# Patient Record
Sex: FEMALE | Race: WHITE | NOT HISPANIC OR LATINO | URBAN - METROPOLITAN AREA
[De-identification: names, ages, dates, MRNs, and addresses within clinical notes are randomized per-mention and may not be internally consistent; named-entity substitution may affect disease eponyms.]

---

## 2019-01-22 ENCOUNTER — HOSPITAL ENCOUNTER (EMERGENCY)
Facility: HOSPITAL | Age: 54
Discharge: HOME | End: 2019-01-22
Attending: EMERGENCY MEDICINE
Payer: COMMERCIAL

## 2019-01-22 ENCOUNTER — APPOINTMENT (EMERGENCY)
Dept: RADIOLOGY | Facility: HOSPITAL | Age: 54
End: 2019-01-22
Payer: COMMERCIAL

## 2019-01-22 VITALS
DIASTOLIC BLOOD PRESSURE: 57 MMHG | RESPIRATION RATE: 20 BRPM | OXYGEN SATURATION: 97 % | HEIGHT: 65 IN | SYSTOLIC BLOOD PRESSURE: 134 MMHG | TEMPERATURE: 98.8 F | HEART RATE: 106 BPM | WEIGHT: 195 LBS | BODY MASS INDEX: 32.49 KG/M2

## 2019-01-22 DIAGNOSIS — T14.8XXA MUSCLE STRAIN: Primary | ICD-10-CM
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
## 2019-01-22 PROCEDURE — 72170 X-RAY EXAM OF PELVIS: CPT

## 2019-01-22 PROCEDURE — 73030 X-RAY EXAM OF SHOULDER: CPT | Mod: 50

## 2019-01-22 PROCEDURE — 72100 X-RAY EXAM L-S SPINE 2/3 VWS: CPT

## 2019-01-22 PROCEDURE — 71045 X-RAY EXAM CHEST 1 VIEW: CPT

## 2019-01-22 PROCEDURE — 63700000 HC SELF-ADMINISTRABLE DRUG: Performed by: PHYSICIAN ASSISTANT

## 2019-01-22 PROCEDURE — 99283 EMERGENCY DEPT VISIT LOW MDM: CPT | Mod: 25

## 2019-01-22 RX ORDER — MOMETASONE FUROATE 100 UG/1
AEROSOL RESPIRATORY (INHALATION)
COMMUNITY
Start: 2018-12-19

## 2019-01-22 RX ORDER — TRAMADOL HYDROCHLORIDE 50 MG/1
50 TABLET ORAL
COMMUNITY
Start: 2018-02-26

## 2019-01-22 RX ORDER — ZOLPIDEM TARTRATE 6.25 MG/1
6.25 TABLET, FILM COATED, EXTENDED RELEASE ORAL DAILY PRN
COMMUNITY
Start: 2018-07-11

## 2019-01-22 RX ORDER — ZOLPIDEM TARTRATE 5 MG/1
5 TABLET ORAL
COMMUNITY
Start: 2018-01-22

## 2019-01-22 RX ORDER — IBUPROFEN 600 MG/1
600 TABLET ORAL ONCE
Status: COMPLETED | OUTPATIENT
Start: 2019-01-22 | End: 2019-01-22

## 2019-01-22 RX ORDER — CYCLOBENZAPRINE HCL 10 MG
10 TABLET ORAL 2 TIMES DAILY PRN
Qty: 10 TABLET | Refills: 0 | Status: SHIPPED | OUTPATIENT
Start: 2019-01-22

## 2019-01-22 RX ORDER — CLOTRIMAZOLE AND BETAMETHASONE DIPROPIONATE 10; .64 MG/G; MG/G
CREAM TOPICAL
COMMUNITY
Start: 2018-11-07

## 2019-01-22 RX ORDER — OMEPRAZOLE 20 MG/1
20 CAPSULE, DELAYED RELEASE ORAL DAILY
COMMUNITY
Start: 2018-08-09

## 2019-01-22 RX ORDER — IBUPROFEN 600 MG/1
600 TABLET ORAL
COMMUNITY
Start: 2017-07-10

## 2019-01-22 RX ORDER — FLUCONAZOLE 150 MG/1
TABLET ORAL
COMMUNITY
Start: 2018-11-07

## 2019-01-22 RX ORDER — ALBUTEROL SULFATE 90 UG/1
1 INHALANT RESPIRATORY (INHALATION)
COMMUNITY
Start: 2017-07-10

## 2019-01-22 RX ADMIN — IBUPROFEN 600 MG: 600 TABLET ORAL at 16:44

## 2019-01-22 ASSESSMENT — ENCOUNTER SYMPTOMS
PALPITATIONS: 0
EYE PAIN: 0
HEADACHES: 0
ABDOMINAL PAIN: 0
HEMATURIA: 0
DYSURIA: 0
SEIZURES: 0
BACK PAIN: 1
LOSS OF CONSCIOUSNESS: 0
NUMBNESS: 0
COLOR CHANGE: 0
ARTHRALGIAS: 0
FEVER: 0
NECK PAIN: 1
SHORTNESS OF BREATH: 0
COUGH: 0
NAUSEA: 0
VOMITING: 0
CHILLS: 0
SORE THROAT: 0
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
## 2019-01-22 NOTE — DISCHARGE INSTRUCTIONS
Rest, avoid exertion  Followup with your primary doctor within 2-3 days. Bring paperwork from today's visit and discuss all labs or imaging results   Return to the ER immediately for any worsening symptoms or other concerns

## 2019-01-23 NOTE — ED ATTESTATION NOTE
The patient was evaluated and managed by the physician assistant.  I agree with the PA/NP’s history, physical, assessment, and plan of care, with the following exceptions: None       Fly Aguirre MD  01/22/19 2015

## 2022-08-05 ENCOUNTER — RX ONLY (OUTPATIENT)
Age: 57
Setting detail: RX ONLY
End: 2022-08-05

## 2022-08-05 ENCOUNTER — APPOINTMENT (RX ONLY)
Dept: URBAN - METROPOLITAN AREA CLINIC 28 | Facility: CLINIC | Age: 57
Setting detail: DERMATOLOGY
End: 2022-08-05

## 2022-08-05 RX ORDER — TRIAMCINOLONE ACETONIDE 1 MG/G
1 APPLICATION CREAM TOPICAL BID PRN
Qty: 80 | Refills: 2 | Status: ERX | COMMUNITY
Start: 2022-08-05

## 2022-08-05 RX ORDER — HYDROCORTISONE 25 MG/G
1 APPLICATION CREAM TOPICAL BID PRN
Qty: 30 | Refills: 2 | Status: ERX | COMMUNITY
Start: 2022-08-05

## 2022-08-23 ENCOUNTER — APPOINTMENT (OUTPATIENT)
Dept: LAB | Facility: HOSPITAL | Age: 57
End: 2022-08-23
Attending: INTERNAL MEDICINE
Payer: COMMERCIAL

## 2022-08-23 ENCOUNTER — TRANSCRIBE ORDERS (OUTPATIENT)
Dept: LAB | Facility: HOSPITAL | Age: 57
End: 2022-08-23

## 2022-08-23 DIAGNOSIS — R79.89 OTHER SPECIFIED ABNORMAL FINDINGS OF BLOOD CHEMISTRY: ICD-10-CM

## 2022-08-23 DIAGNOSIS — C50.919 MALIGNANT NEOPLASM OF UNSPECIFIED SITE OF UNSPECIFIED FEMALE BREAST (CMS/HCC): ICD-10-CM

## 2022-08-23 DIAGNOSIS — E78.2 MIXED HYPERLIPIDEMIA: Primary | ICD-10-CM

## 2022-08-23 DIAGNOSIS — E78.2 MIXED HYPERLIPIDEMIA: ICD-10-CM

## 2022-08-23 LAB
25(OH)D3 SERPL-MCNC: 47 NG/ML (ref 30–100)
ALBUMIN SERPL-MCNC: 4.5 G/DL (ref 3.4–5)
ALP SERPL-CCNC: 68 IU/L (ref 35–126)
ALT SERPL-CCNC: 22 IU/L (ref 11–54)
ANION GAP SERPL CALC-SCNC: 13 MEQ/L (ref 3–15)
AST SERPL-CCNC: 18 IU/L (ref 15–41)
BASOPHILS # BLD: 0.08 K/UL (ref 0.01–0.1)
BASOPHILS NFR BLD: 0.7 %
BILIRUB SERPL-MCNC: 0.6 MG/DL (ref 0.3–1.2)
BUN SERPL-MCNC: 7 MG/DL (ref 8–20)
CALCIUM SERPL-MCNC: 10.1 MG/DL (ref 8.9–10.3)
CANCER AG15-3 SERPL-ACNC: 13.1 U/ML (ref 0–31.3)
CHLORIDE SERPL-SCNC: 101 MEQ/L (ref 98–109)
CHOLEST SERPL-MCNC: 182 MG/DL
CO2 SERPL-SCNC: 25 MEQ/L (ref 22–32)
CREAT SERPL-MCNC: 0.6 MG/DL (ref 0.6–1.1)
DIFFERENTIAL METHOD BLD: ABNORMAL
EOSINOPHIL # BLD: 1.65 K/UL (ref 0.04–0.36)
EOSINOPHIL NFR BLD: 15.1 %
ERYTHROCYTE [DISTWIDTH] IN BLOOD BY AUTOMATED COUNT: 13.5 % (ref 11.7–14.4)
EST. AVERAGE GLUCOSE BLD GHB EST-MCNC: 128 MG/DL
GFR SERPL CREATININE-BSD FRML MDRD: >60 ML/MIN/1.73M*2
GLUCOSE SERPL-MCNC: 93 MG/DL (ref 70–99)
HBA1C MFR BLD HPLC: 6.1 %
HCT VFR BLDCO AUTO: 39.5 % (ref 35–45)
HDLC SERPL-MCNC: 66 MG/DL
HDLC SERPL: 2.8 {RATIO}
HGB BLD-MCNC: 13 G/DL (ref 11.8–15.7)
IMM GRANULOCYTES # BLD AUTO: 0.05 K/UL (ref 0–0.08)
IMM GRANULOCYTES NFR BLD AUTO: 0.5 %
LDLC SERPL CALC-MCNC: 95 MG/DL
LYMPHOCYTES # BLD: 2.66 K/UL (ref 1.2–3.5)
LYMPHOCYTES NFR BLD: 24.3 %
MCH RBC QN AUTO: 29.5 PG (ref 28–33.2)
MCHC RBC AUTO-ENTMCNC: 32.9 G/DL (ref 32.2–35.5)
MCV RBC AUTO: 89.8 FL (ref 83–98)
MONOCYTES # BLD: 0.67 K/UL (ref 0.28–0.8)
MONOCYTES NFR BLD: 6.1 %
NEUTROPHILS # BLD: 5.85 K/UL (ref 1.7–7)
NEUTS SEG NFR BLD: 53.3 %
NONHDLC SERPL-MCNC: 116 MG/DL
NRBC BLD-RTO: 0 %
PDW BLD AUTO: 9.4 FL (ref 9.4–12.3)
PLATELET # BLD AUTO: 310 K/UL (ref 150–369)
POTASSIUM SERPL-SCNC: 4.1 MEQ/L (ref 3.6–5.1)
PROT SERPL-MCNC: 7.2 G/DL (ref 6–8.2)
RBC # BLD AUTO: 4.4 M/UL (ref 3.93–5.22)
SODIUM SERPL-SCNC: 139 MEQ/L (ref 136–144)
TRIGL SERPL-MCNC: 107 MG/DL (ref 30–149)
TSH SERPL DL<=0.05 MIU/L-ACNC: 2.04 MIU/L (ref 0.34–5.6)
WBC # BLD AUTO: 10.96 K/UL (ref 3.8–10.5)

## 2022-08-23 PROCEDURE — 80053 COMPREHEN METABOLIC PANEL: CPT

## 2022-08-23 PROCEDURE — 86300 IMMUNOASSAY TUMOR CA 15-3: CPT

## 2022-08-23 PROCEDURE — 36415 COLL VENOUS BLD VENIPUNCTURE: CPT

## 2022-08-23 PROCEDURE — 82306 VITAMIN D 25 HYDROXY: CPT

## 2022-08-23 PROCEDURE — 85025 COMPLETE CBC W/AUTO DIFF WBC: CPT

## 2022-08-23 PROCEDURE — 83036 HEMOGLOBIN GLYCOSYLATED A1C: CPT

## 2022-08-23 PROCEDURE — 80061 LIPID PANEL: CPT

## 2022-08-23 PROCEDURE — 84443 ASSAY THYROID STIM HORMONE: CPT

## 2022-08-30 ENCOUNTER — APPOINTMENT (OUTPATIENT)
Dept: LAB | Facility: HOSPITAL | Age: 57
End: 2022-08-30
Attending: INTERNAL MEDICINE
Payer: COMMERCIAL

## 2022-08-30 ENCOUNTER — TRANSCRIBE ORDERS (OUTPATIENT)
Dept: LAB | Facility: HOSPITAL | Age: 57
End: 2022-08-30

## 2022-08-30 DIAGNOSIS — R73.9 HYPERGLYCEMIA, UNSPECIFIED: ICD-10-CM

## 2022-08-30 DIAGNOSIS — R73.9 HYPERGLYCEMIA, UNSPECIFIED: Primary | ICD-10-CM

## 2022-08-30 LAB
BACTERIA URNS QL MICRO: ABNORMAL /HPF
BILIRUB UR QL STRIP.AUTO: NEGATIVE MG/DL
CLARITY UR REFRACT.AUTO: CLEAR
COLOR UR AUTO: YELLOW
GLUCOSE UR STRIP.AUTO-MCNC: NEGATIVE MG/DL
HGB UR QL STRIP.AUTO: ABNORMAL
HYALINE CASTS #/AREA URNS LPF: ABNORMAL /LPF
KETONES UR STRIP.AUTO-MCNC: NEGATIVE MG/DL
LEUKOCYTE ESTERASE UR QL STRIP.AUTO: 1
MUCOUS THREADS URNS QL MICRO: 2 /LPF
NITRITE UR QL STRIP.AUTO: NEGATIVE
PH UR STRIP.AUTO: 6 [PH]
PROT UR QL STRIP.AUTO: ABNORMAL
RBC #/AREA URNS HPF: ABNORMAL /HPF
SP GR UR REFRACT.AUTO: 1.03
SQUAMOUS URNS QL MICRO: ABNORMAL /HPF
TRANS CELLS URNS QL MICRO: ABNORMAL /HPF
UROBILINOGEN UR STRIP-ACNC: 0.2 EU/DL
WBC #/AREA URNS HPF: ABNORMAL /HPF

## 2022-08-30 PROCEDURE — 81001 URINALYSIS AUTO W/SCOPE: CPT

## 2023-06-01 ENCOUNTER — RX ONLY (OUTPATIENT)
Age: 58
Setting detail: RX ONLY
End: 2023-06-01

## 2023-06-01 ENCOUNTER — APPOINTMENT (RX ONLY)
Dept: URBAN - METROPOLITAN AREA CLINIC 28 | Facility: CLINIC | Age: 58
Setting detail: DERMATOLOGY
End: 2023-06-01

## 2023-06-01 DIAGNOSIS — L20.89 OTHER ATOPIC DERMATITIS: ICD-10-CM

## 2023-06-01 DIAGNOSIS — L81.4 OTHER MELANIN HYPERPIGMENTATION: ICD-10-CM

## 2023-06-01 DIAGNOSIS — D22 MELANOCYTIC NEVI: ICD-10-CM

## 2023-06-01 DIAGNOSIS — L82.1 OTHER SEBORRHEIC KERATOSIS: ICD-10-CM

## 2023-06-01 DIAGNOSIS — Z71.89 OTHER SPECIFIED COUNSELING: ICD-10-CM

## 2023-06-01 DIAGNOSIS — L71.8 OTHER ROSACEA: ICD-10-CM

## 2023-06-01 PROBLEM — D22.5 MELANOCYTIC NEVI OF TRUNK: Status: ACTIVE | Noted: 2023-06-01

## 2023-06-01 PROCEDURE — ? PRESCRIPTION

## 2023-06-01 PROCEDURE — ? SUNSCREEN RECOMMENDATIONS

## 2023-06-01 PROCEDURE — ? PRESCRIPTION MEDICATION MANAGEMENT

## 2023-06-01 PROCEDURE — ? TREATMENT REGIMEN

## 2023-06-01 PROCEDURE — 99214 OFFICE O/P EST MOD 30 MIN: CPT

## 2023-06-01 PROCEDURE — ? COUNSELING

## 2023-06-01 RX ORDER — HYDROCORTISONE 25 MG/G
1 APPLICATION CREAM TOPICAL BID PRN
Qty: 30 | Refills: 2 | Status: ERX | COMMUNITY
Start: 2023-06-01

## 2023-06-01 RX ORDER — TRIAMCINOLONE ACETONIDE 1 MG/G
1 APPLICATION CREAM TOPICAL BID PRN
Qty: 80 | Refills: 2 | Status: ERX

## 2023-06-01 RX ORDER — TRIAMCINOLONE ACETONIDE 1 MG/G
1 CREAM TOPICAL BID
Qty: 80 | Refills: 2 | Status: ERX | COMMUNITY
Start: 2023-06-01

## 2023-06-01 RX ORDER — TRIAMCINOLONE ACETONIDE 1 MG/G
1 OINTMENT TOPICAL
Qty: 453.6 | Refills: 3 | Status: ERX | COMMUNITY
Start: 2023-06-01

## 2023-06-01 RX ADMIN — TRIAMCINOLONE ACETONIDE 1: 1 CREAM TOPICAL at 00:00

## 2023-06-01 ASSESSMENT — LOCATION DETAILED DESCRIPTION DERM
LOCATION DETAILED: SUPERIOR THORACIC SPINE
LOCATION DETAILED: RIGHT INFERIOR MEDIAL UPPER BACK
LOCATION DETAILED: PERIUMBILICAL SKIN
LOCATION DETAILED: LEFT INFERIOR CENTRAL MALAR CHEEK
LOCATION DETAILED: LEFT MID-UPPER BACK

## 2023-06-01 ASSESSMENT — LOCATION SIMPLE DESCRIPTION DERM
LOCATION SIMPLE: ABDOMEN
LOCATION SIMPLE: LEFT UPPER BACK
LOCATION SIMPLE: RIGHT UPPER BACK
LOCATION SIMPLE: LEFT CHEEK
LOCATION SIMPLE: UPPER BACK

## 2023-06-01 ASSESSMENT — LOCATION ZONE DERM
LOCATION ZONE: TRUNK
LOCATION ZONE: FACE
LOCATION ZONE: TRUNK

## 2023-06-01 NOTE — PROCEDURE: PRESCRIPTION MEDICATION MANAGEMENT
Detail Level: Zone
Continue Regimen: triamcinolone acetonide 0.1 % topical ointment BID: Apply a thin layer to bid to AA of eczema PRN flare (not on face)
Render In Strict Bullet Format?: No
Initiate Treatment: triamcinolone acetonide 0.1 % topical cream BID: Apply a thin layer to bid to AA of eczema PRN flare (not on face)

## 2024-05-20 ENCOUNTER — APPOINTMENT (RX ONLY)
Dept: URBAN - METROPOLITAN AREA CLINIC 28 | Facility: CLINIC | Age: 59
Setting detail: DERMATOLOGY
End: 2024-05-20

## 2024-05-20 DIAGNOSIS — Z71.89 OTHER SPECIFIED COUNSELING: ICD-10-CM

## 2024-05-20 DIAGNOSIS — L81.4 OTHER MELANIN HYPERPIGMENTATION: ICD-10-CM

## 2024-05-20 DIAGNOSIS — Q828 OTHER SPECIFIED ANOMALIES OF SKIN: ICD-10-CM

## 2024-05-20 DIAGNOSIS — Q819 OTHER SPECIFIED ANOMALIES OF SKIN: ICD-10-CM

## 2024-05-20 DIAGNOSIS — Q826 OTHER SPECIFIED ANOMALIES OF SKIN: ICD-10-CM

## 2024-05-20 DIAGNOSIS — L72.11 PILAR CYST: ICD-10-CM

## 2024-05-20 DIAGNOSIS — D22 MELANOCYTIC NEVI: ICD-10-CM

## 2024-05-20 PROBLEM — D22.5 MELANOCYTIC NEVI OF TRUNK: Status: ACTIVE | Noted: 2024-05-20

## 2024-05-20 PROBLEM — L85.8 OTHER SPECIFIED EPIDERMAL THICKENING: Status: ACTIVE | Noted: 2024-05-20

## 2024-05-20 PROCEDURE — ? ADDITIONAL NOTES

## 2024-05-20 PROCEDURE — ? CPT CODE GENERATOR

## 2024-05-20 PROCEDURE — ? COUNSELING

## 2024-05-20 PROCEDURE — ? DEFER

## 2024-05-20 PROCEDURE — ? SUNSCREEN RECOMMENDATIONS

## 2024-05-20 PROCEDURE — ? TREATMENT REGIMEN

## 2024-05-20 PROCEDURE — 99213 OFFICE O/P EST LOW 20 MIN: CPT

## 2024-05-20 ASSESSMENT — LOCATION ZONE DERM
LOCATION ZONE: TRUNK
LOCATION ZONE: SCALP
LOCATION ZONE: ARM
LOCATION ZONE: TRUNK

## 2024-05-20 ASSESSMENT — LOCATION SIMPLE DESCRIPTION DERM
LOCATION SIMPLE: LEFT POSTERIOR UPPER ARM
LOCATION SIMPLE: LEFT UPPER BACK
LOCATION SIMPLE: RIGHT POSTERIOR UPPER ARM
LOCATION SIMPLE: SCALP
LOCATION SIMPLE: UPPER BACK

## 2024-05-20 ASSESSMENT — LOCATION DETAILED DESCRIPTION DERM
LOCATION DETAILED: LEFT MID-UPPER BACK
LOCATION DETAILED: LEFT CENTRAL PARIETAL SCALP
LOCATION DETAILED: SUPERIOR THORACIC SPINE
LOCATION DETAILED: LEFT PROXIMAL POSTERIOR UPPER ARM
LOCATION DETAILED: RIGHT PROXIMAL POSTERIOR UPPER ARM

## 2024-05-20 NOTE — HPI: EVALUATION OF SKIN LESION(S)
What Type Of Note Output Would You Prefer (Optional)?: Bullet Format
Hpi Title: Evaluation of Skin Lesions
Additional History: Patient does not know her family history. Patient states a new lesion on the back of her right arm and a lump on her head.

## 2024-05-20 NOTE — PROCEDURE: CPT CODE GENERATOR
Methotrexate (J-Code: ) Price: 0.00
First Procedure You Would Like A Quote For?: Benign Excision
Location (Required): Scalp
Anticipated Stages (Required): 1
How Many Lesions Are You Destroying?: Less than 15
Number Of Lesions Injected: Less than 8 lesions
Show Pathology Codes?: No
Anticipated Depth And Size Of Soft Tissue Excision (Required): Subcutaneous, Less than 1.5 cm
Repair: Intermediate Primary Closure
Which Photosensitizer Was Used?: Levulan
Fee Schedule Discount For Cash Pay Patients In % Off Of Fee Schedule: 0
Anticipated Depth And Size Of Soft Tissue Excision (Required): Subcutaneous, Less than 3 cm
Anticipated Excised Diameter (Required): 1.1 - 2.0 cm
Include Pathology Charges?: Yes
Anticipated Depth And Size Of Soft Tissue Excision (Required): Subcutaneous, Less than 2 cm
First Biopsy Type: Tangential Biopsy
Detail Level: None
Fee Schedule Discount In % Off Of Fee Schedule: Standard Fee Schedule as Entered in Pricing Tab

## 2024-05-20 NOTE — PROCEDURE: ADDITIONAL NOTES
Additional Notes: Patient requests Ativan for procedure.
Render Risk Assessment In Note?: no
Detail Level: Simple

## 2024-08-20 ENCOUNTER — RX ONLY (OUTPATIENT)
Age: 59
Setting detail: RX ONLY
End: 2024-08-20

## 2024-08-20 RX ORDER — TRIAMCINOLONE ACETONIDE 1 MG/G
1 CREAM TOPICAL BID
Qty: 80 | Refills: 2 | Status: ERX | COMMUNITY
Start: 2024-08-20

## 2024-08-20 RX ORDER — TRIAMCINOLONE ACETONIDE 1 MG/G
1 OINTMENT TOPICAL
Qty: 453.6 | Refills: 3 | Status: ERX

## 2024-08-20 RX ORDER — HYDROCORTISONE 25 MG/G
1 APPLICATION CREAM TOPICAL BID PRN
Qty: 30 | Refills: 2 | Status: ERX

## 2025-04-11 ENCOUNTER — HOSPITAL ENCOUNTER (OUTPATIENT)
Facility: HOSPITAL | Age: 60
Setting detail: HOSPITAL OUTPATIENT SURGERY
End: 2025-04-11
Attending: PODIATRIST | Admitting: PODIATRIST
Payer: COMMERCIAL

## 2025-04-20 NOTE — PROGRESS NOTES
Sports Medicine New Patient Progress Note       Primary Care Provider  Darlene Carroll MD    Referring Provider   No ref. provider found      History of Present Illness:    Dear Dr. Fly Turner,     I had the pleasure of evaluating your patient at [facility].     As you know, this is a 60 y.o. female who works for the [employer] presenting with left foot pain and back pain. She is here with her son Meir, who also provides history. She was living in [place] and relocated to the area recently.     Her left foot and ankle pain started on 3/31/25 after inverting her ankle on uneven ground.   She walked on her foot for the following week before she knew it was fractured. She had an x-ray on 4/10/25 which I visualized today and showed a non-displaced distal fibular fracture and a proximal 5th metatarsal fracture just proximal to the metaphyseal-diaphyseal junction.     She has been using a CAM boot since 4/11/25 and cane for balance. Her left foot pain is up to 7/10 at the worst and is much improved overall from onset. There is no pain at rest. She saw Dr. Ramachandran at [facility], a podiatrist who gave her the option of surgery vs conservative care.     She notes chronic lower back and right leg pain as well. She has epidural injections scheduled for 6/6/25 with Dr. Woods at [facility]. Notes history of 5 lumbar herniated discs. She has prior lumbar SEAN's in January which were helpful but right lumbar radiculopathy has persisted. Notes right numbness diffusely over the foot worse after prolonged sitting. She tried PT in 2014 which made her back pain worse. Back pain radiates down the right lower leg to her foot with pain up to 9/10. Her recent lumbar spine MRI was visualized today and showed multilevel lumbar spondylosis with disc bulging and neural foraminal narrowing most significant at the L4-5 and L5-S1 levels.     She has a history of a torn ACL and LCL in 2005 and underwent reconstructive  surgery.       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      ASMANEX  mcg/actuation HFA aerosol inhaler       clotrimazole-betamethasone (LOTRISONE) 1-0.05 % cream       cyclobenzaprine (FLEXERIL) 10 mg tablet Take 1 tablet (10 mg total) by mouth 2 (two) times a day as needed for muscle spasms for up to 10 doses. 10 tablet 0    fluconazole (DIFLUCAN) 150 mg tablet       ibuprofen (MOTRIN) 600 mg tablet Take 600 mg by mouth.      omeprazole (PriLOSEC) 20 mg capsule Take 20 mg by mouth daily.      ranitidine (ZANTAC) 150 mg tablet Take 150 mg by mouth.      traMADol (ULTRAM) 50 mg tablet Take 50 mg by mouth.      zolpidem (AMBIEN) 5 mg tablet Take 5 mg by mouth.      zolpidem CR (AMBIEN CR) 6.25 mg CR tablet Take 6.25 mg by mouth once daily as needed.       No current facility-administered medications for this visit.      Allergies   Allergen Reactions    Cefuroxime      HIVES    Ciprofloxacin Hives    Sulfasalazine Hives     No family history on file.     PHYSICAL EXAM:   MSK:   Low back exam:  Gait is normal.   Inspection: No evidence of scoliosis.  Palpation: The spinous processes are nontender, the paraspinal muscles are tender bilaterally, the SI joints are nontender, the sciatic notches are nontender, and the greater trochanters are nontender.     There is normal muscle strength and normal tone in the lower extremities bilaterally.  ROM: The patient has  forward flexion to 80 degrees, extension to 30 degrees, and rotation to 50 degrees bilaterally.  Slump test is positive on the right.    Left foot and ankle exam:  Inspection: There is swelling most significant over the lateral ankle,+ ecchymosis over the lateral ankle, lateral foot, and toes, and no erythema.  Palpation: The patient is tender over the distal fibula and ATFL, and mildly tender over the plantar surface of the proximal 5th MT.   ROM: there is slightly decreased dorsiflexion, plantarflexion, inversion, and eversion.     Assessment & Plan  Left foot pain    Orders:    X-RAY FOOT LEFT 3+ VIEWS; Future    Displaced fracture of fifth metatarsal bone, left foot, initial encounter for closed fracture    Orders:    X-RAY FOOT LEFT 3+ VIEWS; Future    Other closed fracture of distal end of left fibula, initial encounter    Orders:    X-RAY ANKLE LEFT 3+ VIEWS; Future       This is a 60 y.o. female with left foot pain with a proximal 5th metatarsal fracture and lateral distal fibular fracture. I recommended continued use of her CAM boot and minimizing weight bearing as much as possible with cane. Note provided to be able to work from home. Discussed surgical vs conservative care and she prefers to avoid surgery at this point. I discussed the potential need for surgical intervention if minimal healing in the future. I recommended straight leg raises daily and likely need for CAM boot for 2 months.    Her lower back pain is consistent with right lumbar radiculopathy. I encouraged continued follow up with Dr. Wodos for epidural injections. Discussed potential to consider gentle PT in the future pending her foot/ankle pain.     Diagnosis and treatment options were discussed in detail with the patient who is in agreement with the plan.         Thank you for allowing me to participate in the care of your patient.  If you have any questions please do not hesitate to contact me.      Sincerely:    Brandon Chavarria,  JESS.  Board Certified Sports Medicine  [facility]

## 2025-04-21 ENCOUNTER — OFFICE VISIT (OUTPATIENT)
Dept: ORTHOPEDICS | Facility: CLINIC | Age: 60
End: 2025-04-21
Payer: COMMERCIAL

## 2025-04-21 ENCOUNTER — HOSPITAL ENCOUNTER (OUTPATIENT)
Dept: RADIOLOGY | Facility: HOSPITAL | Age: 60
Discharge: HOME | End: 2025-04-21
Attending: FAMILY MEDICINE
Payer: COMMERCIAL

## 2025-04-21 DIAGNOSIS — S82.832A OTHER CLOSED FRACTURE OF DISTAL END OF LEFT FIBULA, INITIAL ENCOUNTER: ICD-10-CM

## 2025-04-21 DIAGNOSIS — M79.672 LEFT FOOT PAIN: ICD-10-CM

## 2025-04-21 DIAGNOSIS — S92.352A DISPLACED FRACTURE OF FIFTH METATARSAL BONE, LEFT FOOT, INITIAL ENCOUNTER FOR CLOSED FRACTURE: ICD-10-CM

## 2025-04-21 DIAGNOSIS — M79.672 LEFT FOOT PAIN: Primary | ICD-10-CM

## 2025-04-21 PROCEDURE — 73610 X-RAY EXAM OF ANKLE: CPT | Mod: LT

## 2025-04-21 PROCEDURE — 99204 OFFICE O/P NEW MOD 45 MIN: CPT | Performed by: FAMILY MEDICINE

## 2025-04-21 PROCEDURE — 73630 X-RAY EXAM OF FOOT: CPT | Mod: LT

## 2025-04-21 NOTE — LETTER
April 21, 2025     Patient: Lety Wilson  YOB: 1965  Date of Visit: 4/21/2025    To Whom it May Concern:    Lety Wilson was seen in my clinic on 4/21/2025. She should be allowed to work from home due to current injury until further notice.         Sincerely,         Brandon Chavarria, DO        CC: No Recipients

## 2025-04-21 NOTE — PATIENT INSTRUCTIONS
Please get x-ray for your left foot and ankle   Continue using boot and cane to offload your foot/ankle, and limit weightbearing as much as possible  Follow up with me in 3-4 weeks

## 2025-05-12 ENCOUNTER — HOSPITAL ENCOUNTER (OUTPATIENT)
Dept: RADIOLOGY | Facility: HOSPITAL | Age: 60
Discharge: HOME | End: 2025-05-12
Attending: FAMILY MEDICINE
Payer: COMMERCIAL

## 2025-05-12 ENCOUNTER — OFFICE VISIT (OUTPATIENT)
Dept: ORTHOPEDICS | Facility: CLINIC | Age: 60
End: 2025-05-12
Payer: COMMERCIAL

## 2025-05-12 DIAGNOSIS — S82.832A OTHER CLOSED FRACTURE OF DISTAL END OF LEFT FIBULA, INITIAL ENCOUNTER: ICD-10-CM

## 2025-05-12 DIAGNOSIS — M79.672 LEFT FOOT PAIN: ICD-10-CM

## 2025-05-12 DIAGNOSIS — M79.672 LEFT FOOT PAIN: Primary | ICD-10-CM

## 2025-05-12 DIAGNOSIS — S92.352A DISPLACED FRACTURE OF FIFTH METATARSAL BONE, LEFT FOOT, INITIAL ENCOUNTER FOR CLOSED FRACTURE: ICD-10-CM

## 2025-05-12 PROCEDURE — 76882 US LMTD JT/FCL EVL NVASC XTR: CPT | Performed by: FAMILY MEDICINE

## 2025-05-12 PROCEDURE — 99213 OFFICE O/P EST LOW 20 MIN: CPT | Performed by: FAMILY MEDICINE

## 2025-05-12 PROCEDURE — 73630 X-RAY EXAM OF FOOT: CPT | Mod: LT

## 2025-05-12 PROCEDURE — 73610 X-RAY EXAM OF ANKLE: CPT | Mod: LT

## 2025-05-14 DIAGNOSIS — M79.672 LEFT FOOT PAIN: ICD-10-CM

## 2025-05-14 DIAGNOSIS — S92.352A CLOSED FRACTURE OF BASE OF FIFTH METATARSAL BONE OF LEFT FOOT: ICD-10-CM

## 2025-05-14 DIAGNOSIS — S82.892S: ICD-10-CM

## 2025-05-14 DIAGNOSIS — M25.572 ACUTE LEFT ANKLE PAIN: Primary | ICD-10-CM

## 2025-05-30 ENCOUNTER — OFFICE VISIT (OUTPATIENT)
Dept: ORTHOPEDICS | Facility: CLINIC | Age: 60
End: 2025-05-30
Payer: COMMERCIAL

## 2025-05-30 ENCOUNTER — HOSPITAL ENCOUNTER (OUTPATIENT)
Dept: RADIOLOGY | Facility: HOSPITAL | Age: 60
Discharge: HOME | End: 2025-05-30
Attending: FAMILY MEDICINE
Payer: COMMERCIAL

## 2025-05-30 DIAGNOSIS — S82.892S: ICD-10-CM

## 2025-05-30 DIAGNOSIS — M79.672 LEFT FOOT PAIN: ICD-10-CM

## 2025-05-30 DIAGNOSIS — M79.672 LEFT FOOT PAIN: Primary | ICD-10-CM

## 2025-05-30 DIAGNOSIS — S82.832A OTHER CLOSED FRACTURE OF DISTAL END OF LEFT FIBULA, INITIAL ENCOUNTER: Primary | ICD-10-CM

## 2025-05-30 DIAGNOSIS — S92.352A CLOSED FRACTURE OF BASE OF FIFTH METATARSAL BONE OF LEFT FOOT: ICD-10-CM

## 2025-05-30 DIAGNOSIS — S82.832A OTHER CLOSED FRACTURE OF DISTAL END OF LEFT FIBULA, INITIAL ENCOUNTER: ICD-10-CM

## 2025-05-30 PROCEDURE — 73630 X-RAY EXAM OF FOOT: CPT | Mod: LT

## 2025-05-30 PROCEDURE — 73610 X-RAY EXAM OF ANKLE: CPT | Mod: LT

## 2025-05-30 PROCEDURE — 99214 OFFICE O/P EST MOD 30 MIN: CPT | Performed by: FAMILY MEDICINE

## 2025-05-30 RX ORDER — DICLOFENAC SODIUM 50 MG/1
TABLET, DELAYED RELEASE ORAL
Qty: 60 TABLET | Refills: 0 | Status: SHIPPED | OUTPATIENT
Start: 2025-05-30

## 2025-06-05 DIAGNOSIS — M79.672 LEFT FOOT PAIN: ICD-10-CM

## 2025-06-05 DIAGNOSIS — S82.832A OTHER CLOSED FRACTURE OF DISTAL END OF LEFT FIBULA, INITIAL ENCOUNTER: Primary | ICD-10-CM

## 2025-06-16 NOTE — PROGRESS NOTES
Sports Medicine Progress Note       Primary Care Provider  Darlene Carroll MD    Lety is a 60 y.o. female who works for the [employer] presenting with left foot pain and back pain. She is here with her son Meir, who also provides history. She was living in [place] and relocated to the area recently.     Her left foot and ankle pain started on 3/31/25 after inverting her ankle on uneven ground.   She walked on her foot for the following week before she knew it was fractured. She had an x-ray on 4/10/25 which showed a non-displaced distal fibular fracture and a proximal 5th metatarsal fracture just proximal to the metaphyseal-diaphyseal junction.     She has been using a CAM boot since 4/11/25 and cane for balance. She previously saw Dr. Ramachandran at [facility], a podiatrist who gave her the option of surgery vs conservative care.     She notes chronic lower back and right leg pain as well. She had an epidural injection on 6/6/25 with Dr. Woods at [facility] but notes continued right thigh pain. Notes history of 5 lumbar herniated discs. She has prior lumbar SEAN's in January which were helpful but right lumbar radiculopathy has persisted. Notes right numbness diffusely over the foot worse after prolonged sitting. She tried PT in 2014 which made her back pain worse. Back pain radiates down the right lower leg to her foot with pain up to 9/10. She had a recent lumbar spine MRI which showed multilevel lumbar spondylosis with disc bulging and neural foraminal narrowing most significant at the L4-5 and L5-S1 levels.     Last visit, pain was up to 4/10. She notes feeling improved overall with pain up to 4/10 over the lateral ankle and lateral foot. She notes mild pain over the lateral ankle and lateral foot worse at the end of the day and better in the morning. She has been walking with the CAM at almost all times and minimizing weight bearing as much as possible. She notes pain seems to be worse with weight bearing with the boot  on as it seems to push on the boot. Without her boot on she notes she can feel pain over the lower extremity and notes the compression from her boot may be helping.     Last visit, I recommended follow up with a foot/ankle surgeon. They called Dr. Knott office at [facility], and were told they would have to see a PA, and he would only see her if surgery is needed. I called their office per request and no exception to this policy was available, so they preferred to continue conservative care.       She had a left foot and ankle x-ray visualized today which shows a similar appearing mildly comminuted, obliquely oriented fracture in base of fifth metatarsal which is stably, mildly distracted and a transversely oriented avulsion fracture in lateral malleolus is stable.       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      ASMANEX  mcg/actuation HFA aerosol inhaler       clotrimazole-betamethasone (LOTRISONE) 1-0.05 % cream       cyclobenzaprine (FLEXERIL) 10 mg tablet Take 1 tablet (10 mg total) by mouth 2 (two) times a day as needed for muscle spasms for up to 10 doses. 10 tablet 0    diclofenac (VOLTAREN) 50 mg EC tablet One tablet by mouth three times daily as needed for pain 60 tablet 0    fluconazole (DIFLUCAN) 150 mg tablet       ibuprofen (MOTRIN) 600 mg tablet Take 600 mg by mouth.      omeprazole (PriLOSEC) 20 mg capsule Take 20 mg by mouth daily.       ranitidine (ZANTAC) 150 mg tablet Take 150 mg by mouth.      traMADol (ULTRAM) 50 mg tablet Take 50 mg by mouth.      zolpidem (AMBIEN) 5 mg tablet Take 5 mg by mouth.      zolpidem CR (AMBIEN CR) 6.25 mg CR tablet Take 6.25 mg by mouth once daily as needed.       No current facility-administered medications for this visit.      Allergies   Allergen Reactions    Cefuroxime      HIVES    Ciprofloxacin Hives    Sulfasalazine Hives     No family history on file.     PHYSICAL EXAM:     Left foot and ankle exam:  Inspection: There is trace swelling over the lateral ankle, no ecchymosis and no erythema.  Palpation: The patient is mildly tender over the distal fibula and ATFL, and mildly tender over the proximal 5th MT.   ROM: there is full dorsiflexion, plantarflexion, and slightly decreased inversion, and eversion.     Assessment & Plan  Other closed fracture of distal end of left fibula, initial encounter         Left foot pain            This is a 60 y.o. female with left foot pain with a proximal 5th metatarsal fracture and lateral distal fibular fracture and ATFL sprain. I recommended continued use of her CAM boot and minimizing weight bearing with use of cane. She was able to bear weight with no increased pain today in the office. We discussed potential use of a bone stimulator. I recommended further evaluation with a foot and ankle surgeon for their expertise to weight in on surgery vs continued conservative care due to the potential for non-union discussed today. I recommended follow up with me as needed.    Brandon Chavarria D.O.  Board Certified Sports Medicine  [facility]    I spent 30 minutes on this date of service performing the following: obtaining history, performing examination, entering orders, documenting, preparing for visit, obtaining/reviewing records, and providing counseling and education.

## 2025-06-20 ENCOUNTER — HOSPITAL ENCOUNTER (OUTPATIENT)
Dept: RADIOLOGY | Facility: HOSPITAL | Age: 60
Discharge: HOME | End: 2025-06-20
Attending: FAMILY MEDICINE
Payer: COMMERCIAL

## 2025-06-20 ENCOUNTER — OFFICE VISIT (OUTPATIENT)
Dept: ORTHOPEDICS | Facility: CLINIC | Age: 60
End: 2025-06-20
Payer: COMMERCIAL

## 2025-06-20 DIAGNOSIS — M79.672 LEFT FOOT PAIN: ICD-10-CM

## 2025-06-20 DIAGNOSIS — S82.832A OTHER CLOSED FRACTURE OF DISTAL END OF LEFT FIBULA, INITIAL ENCOUNTER: Primary | ICD-10-CM

## 2025-06-20 DIAGNOSIS — S82.832A OTHER CLOSED FRACTURE OF DISTAL END OF LEFT FIBULA, INITIAL ENCOUNTER: ICD-10-CM

## 2025-06-20 PROCEDURE — 73610 X-RAY EXAM OF ANKLE: CPT | Mod: LT

## 2025-06-20 PROCEDURE — 73630 X-RAY EXAM OF FOOT: CPT | Mod: LT

## 2025-06-20 PROCEDURE — 99214 OFFICE O/P EST MOD 30 MIN: CPT | Performed by: FAMILY MEDICINE

## 2025-06-20 NOTE — PATIENT INSTRUCTIONS
Call to schedule evaluation with Dr. Knott's office for their input  Continue boot and minimize weight bearing, may take off boot for periods of time at home   Let me know if you would like to consider the bone stimulator

## 2025-06-30 DIAGNOSIS — S92.352A DISPLACED FRACTURE OF FIFTH METATARSAL BONE, LEFT FOOT, INITIAL ENCOUNTER FOR CLOSED FRACTURE: Primary | ICD-10-CM

## 2025-06-30 DIAGNOSIS — S82.832D CLOSED FRACTURE OF DISTAL END OF LEFT FIBULA WITH ROUTINE HEALING, UNSPECIFIED FRACTURE MORPHOLOGY, SUBSEQUENT ENCOUNTER: ICD-10-CM

## 2025-06-30 DIAGNOSIS — S99.192D CLOSED FRACTURE OF BASE OF FIFTH METATARSAL BONE OF LEFT FOOT AT METAPHYSEAL-DIAPHYSEAL JUNCTION WITH ROUTINE HEALING, SUBSEQUENT ENCOUNTER: ICD-10-CM

## 2025-08-22 ENCOUNTER — HOSPITAL ENCOUNTER (OUTPATIENT)
Dept: RADIOLOGY | Facility: HOSPITAL | Age: 60
Discharge: HOME | End: 2025-08-22
Attending: FAMILY MEDICINE
Payer: COMMERCIAL

## 2025-08-22 ENCOUNTER — OFFICE VISIT (OUTPATIENT)
Dept: ORTHOPEDICS | Facility: CLINIC | Age: 60
End: 2025-08-22
Payer: COMMERCIAL

## 2025-08-22 DIAGNOSIS — S99.192D CLOSED FRACTURE OF BASE OF FIFTH METATARSAL BONE OF LEFT FOOT AT METAPHYSEAL-DIAPHYSEAL JUNCTION WITH ROUTINE HEALING, SUBSEQUENT ENCOUNTER: ICD-10-CM

## 2025-08-22 DIAGNOSIS — M79.672 LEFT FOOT PAIN: Primary | ICD-10-CM

## 2025-08-22 DIAGNOSIS — S82.832D FRACTURE OF DISTAL END OF TIBIA WITH FIBULA, LEFT, CLOSED, WITH ROUTINE HEALING, SUBSEQUENT ENCOUNTER: ICD-10-CM

## 2025-08-22 DIAGNOSIS — S82.832D CLOSED FRACTURE OF DISTAL END OF LEFT FIBULA WITH ROUTINE HEALING, UNSPECIFIED FRACTURE MORPHOLOGY, SUBSEQUENT ENCOUNTER: ICD-10-CM

## 2025-08-22 DIAGNOSIS — S82.302D FRACTURE OF DISTAL END OF TIBIA WITH FIBULA, LEFT, CLOSED, WITH ROUTINE HEALING, SUBSEQUENT ENCOUNTER: ICD-10-CM

## 2025-08-22 DIAGNOSIS — S99.199D CLOSED FRACTURE OF BASE OF FIFTH METATARSAL BONE AT METAPHYSEAL-DIAPHYSEAL JUNCTION WITH ROUTINE HEALING, SUBSEQUENT ENCOUNTER: ICD-10-CM

## 2025-08-22 PROCEDURE — 73630 X-RAY EXAM OF FOOT: CPT | Mod: LT

## 2025-08-22 PROCEDURE — 73610 X-RAY EXAM OF ANKLE: CPT | Mod: LT

## 2025-08-22 PROCEDURE — 99214 OFFICE O/P EST MOD 30 MIN: CPT | Performed by: FAMILY MEDICINE
